# Patient Record
Sex: MALE | Race: WHITE | HISPANIC OR LATINO | URBAN - METROPOLITAN AREA
[De-identification: names, ages, dates, MRNs, and addresses within clinical notes are randomized per-mention and may not be internally consistent; named-entity substitution may affect disease eponyms.]

---

## 2020-11-01 ENCOUNTER — EMERGENCY (EMERGENCY)
Facility: HOSPITAL | Age: 60
LOS: 1 days | Discharge: HOME | End: 2020-11-01
Attending: EMERGENCY MEDICINE
Payer: COMMERCIAL

## 2020-11-01 VITALS
TEMPERATURE: 98 F | HEART RATE: 88 BPM | WEIGHT: 199.96 LBS | RESPIRATION RATE: 16 BRPM | OXYGEN SATURATION: 97 % | HEIGHT: 68 IN | DIASTOLIC BLOOD PRESSURE: 88 MMHG | SYSTOLIC BLOOD PRESSURE: 143 MMHG

## 2020-11-01 PROCEDURE — 99284 EMERGENCY DEPT VISIT MOD MDM: CPT

## 2020-11-01 NOTE — ED ADULT TRIAGE NOTE - CHIEF COMPLAINT QUOTE
BIB wife to be evaluated for possible anxiety or depression, pt also states he has frequent headaches and worsening forgetfulness, pt denies SI, just states feeling sad all the time

## 2020-11-02 VITALS
HEART RATE: 77 BPM | DIASTOLIC BLOOD PRESSURE: 78 MMHG | RESPIRATION RATE: 18 BRPM | OXYGEN SATURATION: 99 % | SYSTOLIC BLOOD PRESSURE: 129 MMHG | TEMPERATURE: 98 F

## 2020-11-02 DIAGNOSIS — F32.9 MAJOR DEPRESSIVE DISORDER, SINGLE EPISODE, UNSPECIFIED: ICD-10-CM

## 2020-11-02 LAB
ALBUMIN SERPL ELPH-MCNC: 3.4 G/DL — LOW (ref 3.5–5)
ALP SERPL-CCNC: 59 U/L — SIGNIFICANT CHANGE UP (ref 40–120)
ALT FLD-CCNC: 58 U/L DA — SIGNIFICANT CHANGE UP (ref 10–60)
ANION GAP SERPL CALC-SCNC: 5 MMOL/L — SIGNIFICANT CHANGE UP (ref 5–17)
APAP SERPL-MCNC: <2 UG/ML — LOW (ref 10–30)
APPEARANCE UR: CLEAR — SIGNIFICANT CHANGE UP
AST SERPL-CCNC: 35 U/L — SIGNIFICANT CHANGE UP (ref 10–40)
BASOPHILS # BLD AUTO: 0.02 K/UL — SIGNIFICANT CHANGE UP (ref 0–0.2)
BASOPHILS NFR BLD AUTO: 0.2 % — SIGNIFICANT CHANGE UP (ref 0–2)
BILIRUB SERPL-MCNC: 0.3 MG/DL — SIGNIFICANT CHANGE UP (ref 0.2–1.2)
BILIRUB UR-MCNC: NEGATIVE — SIGNIFICANT CHANGE UP
BUN SERPL-MCNC: 17 MG/DL — SIGNIFICANT CHANGE UP (ref 7–18)
CALCIUM SERPL-MCNC: 8.4 MG/DL — SIGNIFICANT CHANGE UP (ref 8.4–10.5)
CHLORIDE SERPL-SCNC: 102 MMOL/L — SIGNIFICANT CHANGE UP (ref 96–108)
CO2 SERPL-SCNC: 29 MMOL/L — SIGNIFICANT CHANGE UP (ref 22–31)
COLOR SPEC: YELLOW — SIGNIFICANT CHANGE UP
CREAT SERPL-MCNC: 1.28 MG/DL — SIGNIFICANT CHANGE UP (ref 0.5–1.3)
DIFF PNL FLD: NEGATIVE — SIGNIFICANT CHANGE UP
EOSINOPHIL # BLD AUTO: 0.08 K/UL — SIGNIFICANT CHANGE UP (ref 0–0.5)
EOSINOPHIL NFR BLD AUTO: 1 % — SIGNIFICANT CHANGE UP (ref 0–6)
ETHANOL SERPL-MCNC: <3 MG/DL — SIGNIFICANT CHANGE UP (ref 0–10)
GLUCOSE SERPL-MCNC: 146 MG/DL — HIGH (ref 70–99)
GLUCOSE UR QL: NEGATIVE — SIGNIFICANT CHANGE UP
HCT VFR BLD CALC: 41.2 % — SIGNIFICANT CHANGE UP (ref 39–50)
HGB BLD-MCNC: 13.4 G/DL — SIGNIFICANT CHANGE UP (ref 13–17)
IMM GRANULOCYTES NFR BLD AUTO: 0.2 % — SIGNIFICANT CHANGE UP (ref 0–1.5)
KETONES UR-MCNC: ABNORMAL
LEUKOCYTE ESTERASE UR-ACNC: ABNORMAL
LYMPHOCYTES # BLD AUTO: 1.1 K/UL — SIGNIFICANT CHANGE UP (ref 1–3.3)
LYMPHOCYTES # BLD AUTO: 13.3 % — SIGNIFICANT CHANGE UP (ref 13–44)
MCHC RBC-ENTMCNC: 30.1 PG — SIGNIFICANT CHANGE UP (ref 27–34)
MCHC RBC-ENTMCNC: 32.5 GM/DL — SIGNIFICANT CHANGE UP (ref 32–36)
MCV RBC AUTO: 92.6 FL — SIGNIFICANT CHANGE UP (ref 80–100)
MONOCYTES # BLD AUTO: 1.03 K/UL — HIGH (ref 0–0.9)
MONOCYTES NFR BLD AUTO: 12.5 % — SIGNIFICANT CHANGE UP (ref 2–14)
NEUTROPHILS # BLD AUTO: 6.01 K/UL — SIGNIFICANT CHANGE UP (ref 1.8–7.4)
NEUTROPHILS NFR BLD AUTO: 72.8 % — SIGNIFICANT CHANGE UP (ref 43–77)
NITRITE UR-MCNC: NEGATIVE — SIGNIFICANT CHANGE UP
NRBC # BLD: 0 /100 WBCS — SIGNIFICANT CHANGE UP (ref 0–0)
PCP SPEC-MCNC: SIGNIFICANT CHANGE UP
PH UR: 6 — SIGNIFICANT CHANGE UP (ref 5–8)
PLATELET # BLD AUTO: 147 K/UL — LOW (ref 150–400)
POTASSIUM SERPL-MCNC: 4.3 MMOL/L — SIGNIFICANT CHANGE UP (ref 3.5–5.3)
POTASSIUM SERPL-SCNC: 4.3 MMOL/L — SIGNIFICANT CHANGE UP (ref 3.5–5.3)
PROT SERPL-MCNC: 7.1 G/DL — SIGNIFICANT CHANGE UP (ref 6–8.3)
PROT UR-MCNC: 15
RBC # BLD: 4.45 M/UL — SIGNIFICANT CHANGE UP (ref 4.2–5.8)
RBC # FLD: 13.2 % — SIGNIFICANT CHANGE UP (ref 10.3–14.5)
SALICYLATES SERPL-MCNC: <1.7 MG/DL — LOW (ref 2.8–20)
SARS-COV-2 IGG SERPL QL IA: NEGATIVE — SIGNIFICANT CHANGE UP
SARS-COV-2 IGM SERPL IA-ACNC: 0.09 INDEX — SIGNIFICANT CHANGE UP
SARS-COV-2 RNA SPEC QL NAA+PROBE: SIGNIFICANT CHANGE UP
SODIUM SERPL-SCNC: 136 MMOL/L — SIGNIFICANT CHANGE UP (ref 135–145)
SP GR SPEC: 1.01 — SIGNIFICANT CHANGE UP (ref 1.01–1.02)
TSH SERPL-MCNC: 0.44 UU/ML — SIGNIFICANT CHANGE UP (ref 0.34–4.82)
UROBILINOGEN FLD QL: 1
WBC # BLD: 8.26 K/UL — SIGNIFICANT CHANGE UP (ref 3.8–10.5)
WBC # FLD AUTO: 8.26 K/UL — SIGNIFICANT CHANGE UP (ref 3.8–10.5)

## 2020-11-02 PROCEDURE — 86769 SARS-COV-2 COVID-19 ANTIBODY: CPT

## 2020-11-02 PROCEDURE — 70450 CT HEAD/BRAIN W/O DYE: CPT | Mod: 26

## 2020-11-02 PROCEDURE — 70450 CT HEAD/BRAIN W/O DYE: CPT

## 2020-11-02 PROCEDURE — 93005 ELECTROCARDIOGRAM TRACING: CPT

## 2020-11-02 PROCEDURE — 85025 COMPLETE CBC W/AUTO DIFF WBC: CPT

## 2020-11-02 PROCEDURE — 80053 COMPREHEN METABOLIC PANEL: CPT

## 2020-11-02 PROCEDURE — 36415 COLL VENOUS BLD VENIPUNCTURE: CPT

## 2020-11-02 PROCEDURE — 90792 PSYCH DIAG EVAL W/MED SRVCS: CPT | Mod: 95

## 2020-11-02 PROCEDURE — 87635 SARS-COV-2 COVID-19 AMP PRB: CPT

## 2020-11-02 PROCEDURE — 80307 DRUG TEST PRSMV CHEM ANLYZR: CPT

## 2020-11-02 PROCEDURE — 81001 URINALYSIS AUTO W/SCOPE: CPT

## 2020-11-02 PROCEDURE — 99284 EMERGENCY DEPT VISIT MOD MDM: CPT | Mod: 25

## 2020-11-02 PROCEDURE — 84443 ASSAY THYROID STIM HORMONE: CPT

## 2020-11-02 NOTE — ED BEHAVIORAL HEALTH NOTE - BEHAVIORAL HEALTH NOTE
===================  PRE-HOSPITAL COURSE  ===================  SOURCE:  RN/ED documentation   DETAILS:  Pt. BIB wife for psychiatric evaluation, c/o worsening anxiety, depression, forgetfulness     ============  ED COURSE   ============  SOURCE:  RN/ED documentation   ARRIVAL:  accompanied by wife  BELONGINGS:  no belongings of note   BEHAVIOR: Pt. arrived to ED alert and oriented, appearing in fair hygiene.  Pt. calm and cooperative, complied with triage processes no issue.  Pt. maintains fair eye contact, speech is of normal rate and volume, thoughts seemingly logical and linear in nature.  Pt. denies SI/HI and AH/VH; expresses recently has been feeling sad more often and has been forgetting things more frequently. Pt. reported mood depression, affect observed to be congruent.  Pt. resting comfortably.   TREATMENT:  no medications given, no security intervention required   VISITORS:  no visitors present in ED     ========================  COLLATERAL  ========================  NAME: Franchesca Thorpe  NUMBER: [phone]  RELATIONSHIP: wife  RELIABILITY: reliable   COMMENTS: Wife reports that pt. has been noticeably more forgetful x1 week, and indicates that this is causing herself and pt. to worry.  She expresses concern that pt. may be experiencing depression but denies any psychiatric hx, and denies current concern for SIB/SI/SA.  Collateral expresses that she is not concerned for pt. safety at this time and feels comfortable with pt. returning home, does not believe he needs inpatient admission.  She does share belief that pt. may benefit from seeing a provider on a regular basis and will encourage pt. to follow up with outpatient resources provided. She confirms there are no firearms/weapons in the home.           COVID Exposure Screen- collateral   1.        *In the past 14 days, has the patient been around anyone with a positive COVID-19 test?*  (  ) Yes   ( X ) No   (  ) Unknown- Reason (e.g. collateral uncertain, refusing to answer, etc.):  ______  IF YES PROCEED TO QUESTION #2. IF NO or UNKNOWN, PLEASE SKIP TO QUESTION #7  2.        Was the patient within 6 feet of them for at least 15 minutes? (  ) Yes   (  ) No   (  ) Unknown- Reason: ______   3.        Did the patient provide care for them? (  ) Yes   (  ) No   (  ) Unknown- Reason: ______   4.        Did the patient have direct physical contact with them (touched, hugged, or kissed them)? (  ) Yes   (  ) No    (  ) Unknown- Reason: ______   5.        Did the patient share eating or drinking utensils with them? (  ) Yes   (  ) No    (  ) Unknown- Reason: ______   6.        Have they sneezed, coughed, or somehow got respiratory droplets on the patient? (  ) Yes   (  ) No    (  ) Unknown- Reason: ______   7.        *Has the patient been out of [place] within the past 14 days?*  (  ) Yes   (  X ) No   (  ) Unknown- Reason (e.g. collateral uncertain, refusing to answer, etc.): _______  IF YES PLEASE ANSWER THE FOLLOWING QUESTIONS:  8.        Which state/country has the patient been to? ______   9.        Was the patient there over 24 hours? (  ) Yes   (  ) No    (  ) Unknown- Reason: ______   10.     What date did the patient return to [facility]? ______

## 2020-11-02 NOTE — ED PROVIDER NOTE - CLINICAL SUMMARY MEDICAL DECISION MAKING FREE TEXT BOX
59 y.o male with history of depression with worsening headache , forgetfulness and increased depressive symptoms, with new paranoia . will obtain labs, ct head and will consult telepsych for further evaluation. 59 y.o male with history of depression with worsening headache , forgetfulness and increased depressive symptoms, with new paranoia . will obtain labs, ct head and will consult telepsych for further evaluation.    labs and CT head unremarkable  @2am telepsych Dr. Llamas consulted  @5am Dr. Llamas reports patient's presentation likely combination of depression and possible vascular dementia. He does not meet criteria for inpatient psych admission at this time. Recommends outpatient followup with psych and neurolgy.  Discussed above with patient and wife at bedside who feels comfortably taking him home and making outpatient appointment for patient.

## 2020-11-02 NOTE — ED PROVIDER NOTE - OBJECTIVE STATEMENT
59 y.o male with a PMHx of HTN , GERD, chronic back pain, chronic headache and depression presents to the ED c.o worsening depression symptoms. Patient endorses increased forgetfulness , per wife patient is forgetting where he put his keys, wallet, and phone and she later on finds them but he is never able to remember where he placed it. Patient endorses he has been having chronic headaches for x5 years but it has recently gotten worse. Patient endorses history of OCD behavior since he was a teenager but was never fully diagnosed. Patient endorses x5 years ago he was put on Citalopram 20 mg daily, but he does not recall the last time he took it , he states he has a bottle from 2018 that still has tablets in it. 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 Per wife she states patient has been acting strangely recently where he looks out the windows and doors looking for someone but he can never identify who he is looking for. Per wife patient has been more depressed and doesn't want to do anything and cannot sleep despite being on ambian. Patient endorses he has not seen a psychiatrist for his OCD or depression. Patient endorses he has a preoccupation with buying things and being unable to discard of them, but denies being in debt due to purchases. Patient denies any SI, HI , visual or auditory hallucinations or any other acute complaints. NKDA 59 y.o male with a PMHx of HTN , GERD, chronic back pain, chronic headache and depression presents to the ED c.o worsening depression symptoms. Patient endorses increased forgetfulness , per wife patient is forgetting where he put his keys, wallet, and phone and she later on finds them but he is never able to remember where he placed it. Patient endorses he has been having chronic headaches for x5 years but it has recently gotten worse. Patient endorses history of OCD behavior since he was a teenager but was never fully diagnosed. Patient endorses x5 years ago he was put on Citalopram 20 mg daily, but he does not recall the last time he took it , he states he has a bottle from 2018 that still has tablets in it. 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 Per wife she states patient has been acting strangely recently where he looks out the windows and doors looking for someone but he can never identify who he is looking for. Per wife patient has been more depressed and doesn't want to do anything and cannot sleep despite being on ambien. Patient endorses he has not seen a psychiatrist for his OCD or depression. Patient endorses he has a preoccupation with buying things and being unable to discard of them, but denies being in debt due to purchases. Patient denies any SI, HI , visual or auditory hallucinations or any other acute complaints. NKDA  Meds: zolpidem 5mg qhs, omeprazole 20mg qd, lisinopril 10mg qd, nabumetone 500mg twice daily, albuterol 4mg  Patient reports taking natural supplements in adidtion to medications including Padmini's Wort

## 2020-11-02 NOTE — ED PROVIDER NOTE - NSFOLLOWUPCLINICS_GEN_ALL_ED_FT
[facility]  Psychiatry  [address] [place] [address]  Phone: [phone]  Fax:     [facility]  Neurology  [address] [place] [address]  Phone: [phone]  Fax: [phone]  Follow Up Time:

## 2020-11-02 NOTE — ED BEHAVIORAL HEALTH ASSESSMENT NOTE - SUMMARY
Pt is a 58YO   male, domiciled with spouse, pmh HTN, chronic back pain, GERD, pphx depression, no suicide attempts, no psych admissions, no substance use, no legal history, BIB spouse for worsening depression, paranoia, forgetfulness.    CT head findings consistent with mild generalized parenchymal volume loss and likely mild microvascular ischemic disease. Pt is a 58YO   male, domiciled with spouse, pmh HTN, chronic back pain, GERD, pphx depression, no suicide attempts, no psych admissions, no substance use, no legal history, BIB spouse for worsening depression, paranoia, forgetfulness. He did also have a mechanical fall with impact to head while at work several weeks ago and noticed worsening of forgetfulness since then. CT head findings also consistent with mild generalized parenchymal volume loss and likely mild microvascular ischemic disease.  Pt presentation likely combination of depression and possible vascular dementia.     He does not meet criteria for inpatient psych admission at this time. He is not acute risk to self or others. He is not gravely disabled due to psychiatric condition. He is amenable to establishing care with neurologist and psychiatrist as outpatient.     Collateral reassuring from wife who is comfortable having patient come home to follow up with outpt psychiatrist and neurologist.

## 2020-11-02 NOTE — ED PROVIDER NOTE - NSFOLLOWUPINSTRUCTIONS_ED_ALL_ED_FT
Followup with psychiatry and neurology listed below for reevaluation.  Return to ED if you develop though of hurting yourself or other people around you.      WHAT YOU NEED TO KNOW:    Depression is a medical condition that causes feelings of sadness or hopelessness that do not go away. Depression may cause you to lose interest in things you used to enjoy. These feelings may interfere with your daily life.    DISCHARGE INSTRUCTIONS:    Call your local emergency number (911 in the ) if:   •You think about harming yourself or someone else.      •You have done something on purpose to hurt yourself.      Call your therapist or doctor if:   •Your symptoms do not improve.      •You cannot make it to your next appointment.       •You have new symptoms.      •You have questions or concerns about your condition or care.      The following resources are available at any time to help you, if needed:   •National Suicide Prevention Lifeline: 1-316.805.1590 (6-726-373-TALK)      •Suicide Hotline: 1-916.789.8780 (1-584-VKURLZD)      •For a list of international numbers: https://save.org/find-help/international-resources/      Medicines:   •Antidepressants may be given to improve or balance your mood. You may need to take this medicine for several weeks before you begin to feel better.      •Take your medicine as directed. Contact your healthcare provider if you think your medicine is not helping or if you have side effects. Tell him of her if you are allergic to any medicine. Keep a list of the medicines, vitamins, and herbs you take. Include the amounts, and when and why you take them. Bring the list or the pill bottles to follow-up visits. Carry your medicine list with you in case of an emergency.      Therapy is often used together with medicine to relieve depression. Therapy is a way for you to talk about your feelings and anything that may be causing depression. Therapy can be done alone or in a group. It may also be done with family members or a significant other.    Self-care:   •Get regular physical activity. Try to be active for 30 minutes, 3 to 5 days a week. Physical activity can help relieve depression. Work with your healthcare provider to develop a plan that you enjoy. It may help to ask someone to be active with you.      •Create a regular sleep schedule. A routine can help you relax before bed. Listen to music, read, or do yoga. Try to go to bed and wake up at the same time every day. Sleep is important for emotional health.      •Eat a variety of healthy foods. Healthy foods include fruits, vegetables, whole-grain breads, low-fat dairy products, lean meats, fish, and cooked beans. A healthy meal plan is low in fat, salt, and added sugar.      •Do not drink alcohol or use drugs. Alcohol and drugs can make depression worse. Talk to your therapist or doctor if you need help quitting.      Follow up with your healthcare provider as directed: Your healthcare provider will monitor your progress at follow-up visits. He or she will also monitor your medicine if you take antidepressants. Your healthcare provider will ask if the medicine is helping. Tell him or her about any side effects or problems you may have with your medicine. The type or amount of medicine may need to be changed. Write down your questions so you remember to ask them during your visits.

## 2020-11-02 NOTE — ED BEHAVIORAL HEALTH ASSESSMENT NOTE - RISK ASSESSMENT
see above. low acute risk. low chronic risk. no hx of SI or suicide attempt. Denies SI. Low Acute Suicide Risk

## 2020-11-02 NOTE — ED BEHAVIORAL HEALTH ASSESSMENT NOTE - SUICIDE PROTECTIVE FACTORS
Identifies reasons for living/Responsibility to family and others/Engaged in work or school/Has future plans

## 2020-11-02 NOTE — ED BEHAVIORAL HEALTH ASSESSMENT NOTE - HPI (INCLUDE ILLNESS QUALITY, SEVERITY, DURATION, TIMING, CONTEXT, MODIFYING FACTORS, ASSOCIATED SIGNS AND SYMPTOMS)
Pt is a 58YO   male, domiciled with spouse, pmh HTN, chronic back pain, GERD, pphx depression, no suicide attempts, no psych admissions, no substance use, no legal history, BIB spouse for worsening depression, paranoia, forgetfulness.         COVID screening questions: Pt is a 58YO   male, domiciled with spouse, pmh HTN, chronic back pain, GERD, pphx depression, no suicide attempts, no psych admissions, no substance use, no legal history, BIB spouse for worsening depression, paranoia, forgetfulness.     Takes St Johns Wort for OCD and anxiety symptoms. Reports he has had symptoms since he was teen, would do "everything in fours." Feels this helps. Has insomnia sometimes, takes valerian root. Notes hoarding tendencies, compulsive shopping tendencies. States he also has paranoia that someone might be watching/spying on him, states he has had this on and off for a while. Notes he has had trouble falling asleep chronically, sometimes will wake up early in morning. Ambien helps - has been taking this on and off.     Notes he had head injury on the job several weeks ago. Notes he has gotten more forgetful since then - forgetting where he placed his keys, forgetting details of events, difficulty focusing/concentrating.     COVID screening questions: Pt is a 58YO   male, domiciled with spouse, pmh HTN, chronic back pain, GERD, pphx depression, no suicide attempts, no psych admissions, no substance use, no legal history, BIB spouse for worsening depression, paranoia, forgetfulness.     Takes St Johns Wort for OCD and anxiety symptoms. Reports he has had symptoms since he was teen, would do "everything in fours." Feels this helps. Has insomnia sometimes, takes valerian root. Notes hoarding tendencies, compulsive shopping tendencies. States he also has paranoia that someone might be watching/spying on him, states he has had this on and off for a while. Notes he has had trouble falling asleep chronically, sometimes will wake up early in morning. Ambien helps - has been taking this on and off. Denies SI. Denies HI. Denies AVH. Denies manic symptoms.     Notes he had head injury on the job several weeks ago. Notes he has gotten more forgetful since then - forgetting where he placed his keys, forgetting details of events, difficulty focusing/concentrating.     COVID screening questions:  Pt denies encounters with COVID+ person/s in past 14 days.  Pt denies travel outside of [facility] in past 14 days.

## 2020-11-02 NOTE — ED PROVIDER NOTE - CARE PROVIDER_API CALL
Trav Dobbins)  Neurology  Epilepsy  [address], [address]  [place] [address]  Phone: [phone]  Follow Up Time:

## 2020-11-02 NOTE — ED PROVIDER NOTE - PATIENT PORTAL LINK FT
You can access the FollowMyHealth Patient Portal offered by [facility] by registering at the following website: [email or web address]. By joining [employer]’s FollowMyHealth portal, you will also be able to view your health information using other applications (apps) compatible with our system.

## 2020-11-02 NOTE — ED PROVIDER NOTE - CARE PROVIDERS DIRECT ADDRESSES
[email or web address]
